# Patient Record
Sex: MALE | Race: WHITE | NOT HISPANIC OR LATINO
[De-identification: names, ages, dates, MRNs, and addresses within clinical notes are randomized per-mention and may not be internally consistent; named-entity substitution may affect disease eponyms.]

---

## 2017-09-21 ENCOUNTER — APPOINTMENT (OUTPATIENT)
Dept: FAMILY MEDICINE | Facility: CLINIC | Age: 32
End: 2017-09-21

## 2017-10-26 ENCOUNTER — APPOINTMENT (OUTPATIENT)
Dept: FAMILY MEDICINE | Facility: CLINIC | Age: 32
End: 2017-10-26
Payer: SELF-PAY

## 2017-10-26 VITALS — SYSTOLIC BLOOD PRESSURE: 157 MMHG | DIASTOLIC BLOOD PRESSURE: 100 MMHG

## 2017-10-26 PROCEDURE — 99213 OFFICE O/P EST LOW 20 MIN: CPT

## 2017-11-16 ENCOUNTER — APPOINTMENT (OUTPATIENT)
Dept: FAMILY MEDICINE | Facility: CLINIC | Age: 32
End: 2017-11-16

## 2018-01-30 ENCOUNTER — APPOINTMENT (OUTPATIENT)
Dept: FAMILY MEDICINE | Facility: CLINIC | Age: 33
End: 2018-01-30

## 2018-02-08 ENCOUNTER — APPOINTMENT (OUTPATIENT)
Dept: FAMILY MEDICINE | Facility: CLINIC | Age: 33
End: 2018-02-08
Payer: SELF-PAY

## 2018-02-08 VITALS — SYSTOLIC BLOOD PRESSURE: 161 MMHG | DIASTOLIC BLOOD PRESSURE: 114 MMHG

## 2018-02-08 PROCEDURE — 99213 OFFICE O/P EST LOW 20 MIN: CPT

## 2018-08-23 ENCOUNTER — APPOINTMENT (OUTPATIENT)
Dept: FAMILY MEDICINE | Facility: CLINIC | Age: 33
End: 2018-08-23

## 2018-11-16 ENCOUNTER — RX RENEWAL (OUTPATIENT)
Age: 33
End: 2018-11-16

## 2019-06-05 ENCOUNTER — RX RENEWAL (OUTPATIENT)
Age: 34
End: 2019-06-05

## 2019-07-01 ENCOUNTER — APPOINTMENT (OUTPATIENT)
Dept: FAMILY MEDICINE | Facility: CLINIC | Age: 34
End: 2019-07-01
Payer: SELF-PAY

## 2019-07-01 VITALS — DIASTOLIC BLOOD PRESSURE: 100 MMHG | SYSTOLIC BLOOD PRESSURE: 140 MMHG

## 2019-07-01 VITALS — TEMPERATURE: 98.3 F

## 2019-07-01 PROCEDURE — 99213 OFFICE O/P EST LOW 20 MIN: CPT

## 2019-07-01 NOTE — HISTORY OF PRESENT ILLNESS
[de-identified] : 33y/o M with PMHx of HTN (Valsartan) presents to the office for BP check and medication renewal. Pt c/o productive cough and PND for 5 days. Pt states cough and nasal discharge produces green mucous. Assoc. sx of sore throat, with raspy voice for 2 days. Additionally, pt c/o clogged ears, Pt has been using Sudafed and Advil Sinus for 2 days. Pt admits to not checking BP at home. BP in office is 140/100. Denies fatigue, chills, fever.

## 2019-07-01 NOTE — PLAN
[FreeTextEntry1] : Sinuitis \par - If no improvement in 4 days, start Amoxicillin TID \par - Mucinex D for 4 days\par - Flonase BID for 4 days\par - Hydration / Supportive care\par - Steam shower \par \par HTN\par - D/c Valsartan, due to pt not having insurance.  Bp still eleavted altho took sudafed for 2 days\par - Prescribed to start Lisinopril \par - F/u in  4 weeks.

## 2019-07-01 NOTE — ADDENDUM
[FreeTextEntry1] : I, Neris Jo, acted solely as a scribe for Dr. Briones on this date 07/01/2019.\par \par All medical record entries made by the Scribe were at my, Dr. Briones, direction and personally dictated by me on 07/01/2019. I have reviewed the chart and agree that the record accurately reflects my personal performance of the history, physical exam, assessment and plan.  I have also personally directed, reviewed and agreed with the chart.

## 2019-07-01 NOTE — REVIEW OF SYSTEMS
[Earache] : earache [Postnasal Drip] : postnasal drip [Nasal Discharge] : nasal discharge [Sore Throat] : sore throat [Cough] : cough [Negative] : Heme/Lymph [Fever] : no fever [Chills] : no chills [Fatigue] : no fatigue

## 2019-07-01 NOTE — PHYSICAL EXAM
[Normal] : no JVD, supple, thyroid normal/no nodules, no lymphadenopathy [de-identified] : mild injected with clear mucous in pharynx  [de-identified] : AA&Ox3

## 2020-07-28 ENCOUNTER — APPOINTMENT (OUTPATIENT)
Dept: FAMILY MEDICINE | Facility: CLINIC | Age: 35
End: 2020-07-28
Payer: SELF-PAY

## 2020-07-28 VITALS
WEIGHT: 236 LBS | DIASTOLIC BLOOD PRESSURE: 90 MMHG | BODY MASS INDEX: 29.34 KG/M2 | OXYGEN SATURATION: 97 % | RESPIRATION RATE: 16 BRPM | HEIGHT: 75 IN | TEMPERATURE: 98.1 F | HEART RATE: 83 BPM | SYSTOLIC BLOOD PRESSURE: 134 MMHG

## 2020-07-28 DIAGNOSIS — Z23 ENCOUNTER FOR IMMUNIZATION: ICD-10-CM

## 2020-07-28 PROCEDURE — 99214 OFFICE O/P EST MOD 30 MIN: CPT

## 2020-07-28 RX ORDER — VALSARTAN 320 MG/1
320 TABLET, COATED ORAL DAILY
Qty: 30 | Refills: 0 | Status: COMPLETED | COMMUNITY
Start: 2017-10-26 | End: 2020-07-28

## 2020-07-28 NOTE — HISTORY OF PRESENT ILLNESS
[de-identified] : 34 y/o M PMHx HTN (Lisinopril) Plan presents to office for BP check. Patient has not been here in over one year. Patient recently has been monitoring blood pressures blood pressure readings 143/103, 139/96, patient states several months ago he did have some loss of taste and smell concerned he could have had colds it. Denies any headache or shortness of breath at this time. Patient has been exercising and has been compliant with salt in his diet. Patient also requests a refill on Propecia

## 2021-06-10 ENCOUNTER — APPOINTMENT (OUTPATIENT)
Dept: FAMILY MEDICINE | Facility: CLINIC | Age: 36
End: 2021-06-10
Payer: SELF-PAY

## 2021-06-10 VITALS — DIASTOLIC BLOOD PRESSURE: 92 MMHG | SYSTOLIC BLOOD PRESSURE: 146 MMHG | TEMPERATURE: 95.7 F

## 2021-06-10 DIAGNOSIS — J20.9 ACUTE BRONCHITIS, UNSPECIFIED: ICD-10-CM

## 2021-06-10 DIAGNOSIS — J32.9 CHRONIC SINUSITIS, UNSPECIFIED: ICD-10-CM

## 2021-06-10 PROCEDURE — 99214 OFFICE O/P EST MOD 30 MIN: CPT

## 2021-06-10 RX ORDER — AMOXICILLIN 875 MG/1
875 TABLET, FILM COATED ORAL
Qty: 14 | Refills: 0 | Status: ACTIVE | COMMUNITY
Start: 2019-07-01 | End: 1900-01-01

## 2021-06-10 NOTE — REVIEW OF SYSTEMS
[Fatigue] : fatigue [Postnasal Drip] : postnasal drip [Nasal Discharge] : nasal discharge [Sore Throat] : sore throat [Cough] : cough [Swollen Glands] : swollen glands [Negative] : Gastrointestinal

## 2021-06-10 NOTE — HISTORY OF PRESENT ILLNESS
[FreeTextEntry8] : 35 y/o M PMHx HTN (Lisinopril) Plan presents to office for BP check.Pt also c/o  sinus congestion for 5 days. Denies Fever. Took DayQuil and Sudafed.Out of BP meds for 3-4 days. Thick green nasal discharge and green mucus .Mild HA. Monitoring Bg at home all good readings <130/85

## 2021-06-10 NOTE — PHYSICAL EXAM
[Normal] : affect was normal and insight and judgment were intact [de-identified] : mucus in throat [de-identified] : diffuse rhonchi

## 2022-01-15 ENCOUNTER — RX RENEWAL (OUTPATIENT)
Age: 37
End: 2022-01-15

## 2022-06-02 ENCOUNTER — APPOINTMENT (OUTPATIENT)
Dept: FAMILY MEDICINE | Facility: CLINIC | Age: 37
End: 2022-06-02
Payer: SELF-PAY

## 2022-06-02 VITALS
HEART RATE: 81 BPM | OXYGEN SATURATION: 97 % | HEIGHT: 75 IN | DIASTOLIC BLOOD PRESSURE: 92 MMHG | SYSTOLIC BLOOD PRESSURE: 124 MMHG | WEIGHT: 225 LBS | BODY MASS INDEX: 27.98 KG/M2

## 2022-06-02 DIAGNOSIS — Z72.89 OTHER PROBLEMS RELATED TO LIFESTYLE: ICD-10-CM

## 2022-06-02 PROCEDURE — 99214 OFFICE O/P EST MOD 30 MIN: CPT

## 2022-06-04 PROBLEM — Z72.89 ALCOHOL DRINKER: Status: ACTIVE | Noted: 2022-06-04

## 2022-06-04 NOTE — HISTORY OF PRESENT ILLNESS
[de-identified] : 38 y/o M PMHx HTN (Lisinopril) Plan presents to office for BP check.Pt admits to drinking 3-5 glasses of alcohol per night as he is a  and more on weekends. Eats fairly healthy but not exercising much. Offers no complaints today. Needs refill on meeds

## 2022-08-02 ENCOUNTER — APPOINTMENT (OUTPATIENT)
Dept: FAMILY MEDICINE | Facility: CLINIC | Age: 37
End: 2022-08-02

## 2022-08-02 RX ORDER — LISINOPRIL 40 MG/1
40 TABLET ORAL
Qty: 7 | Refills: 0 | Status: COMPLETED | COMMUNITY
Start: 2019-07-01 | End: 2022-08-02

## 2022-08-30 ENCOUNTER — APPOINTMENT (OUTPATIENT)
Dept: FAMILY MEDICINE | Facility: CLINIC | Age: 37
End: 2022-08-30

## 2022-09-29 ENCOUNTER — APPOINTMENT (OUTPATIENT)
Dept: FAMILY MEDICINE | Facility: CLINIC | Age: 37
End: 2022-09-29

## 2023-05-19 ENCOUNTER — APPOINTMENT (OUTPATIENT)
Dept: FAMILY MEDICINE | Facility: CLINIC | Age: 38
End: 2023-05-19
Payer: SELF-PAY

## 2023-05-19 VITALS
TEMPERATURE: 99.6 F | RESPIRATION RATE: 16 BRPM | DIASTOLIC BLOOD PRESSURE: 98 MMHG | HEART RATE: 89 BPM | OXYGEN SATURATION: 97 % | SYSTOLIC BLOOD PRESSURE: 142 MMHG

## 2023-05-19 PROCEDURE — 99213 OFFICE O/P EST LOW 20 MIN: CPT

## 2023-05-19 RX ORDER — AMLODIPINE BESYLATE 5 MG/1
5 TABLET ORAL
Qty: 60 | Refills: 5 | Status: COMPLETED | COMMUNITY
Start: 2020-07-28 | End: 2023-05-19

## 2023-05-19 NOTE — HISTORY OF PRESENT ILLNESS
[de-identified] : 39 y/o M PMH HTN (Amlodpine, Irbesartan) presents for BP f/up.Monitoring BP athome readings 140 90's 150/90's. Watches diet\par Increased Amlodipine to BID some ankle swelling

## 2023-06-08 ENCOUNTER — APPOINTMENT (OUTPATIENT)
Dept: FAMILY MEDICINE | Facility: CLINIC | Age: 38
End: 2023-06-08

## 2023-06-12 ENCOUNTER — NON-APPOINTMENT (OUTPATIENT)
Age: 38
End: 2023-06-12

## 2023-08-24 ENCOUNTER — APPOINTMENT (OUTPATIENT)
Dept: FAMILY MEDICINE | Facility: CLINIC | Age: 38
End: 2023-08-24
Payer: SELF-PAY

## 2023-08-24 VITALS
RESPIRATION RATE: 16 BRPM | DIASTOLIC BLOOD PRESSURE: 90 MMHG | OXYGEN SATURATION: 98 % | TEMPERATURE: 98.6 F | BODY MASS INDEX: 28.5 KG/M2 | HEART RATE: 108 BPM | SYSTOLIC BLOOD PRESSURE: 140 MMHG | WEIGHT: 228 LBS

## 2023-08-24 DIAGNOSIS — I10 ESSENTIAL (PRIMARY) HYPERTENSION: ICD-10-CM

## 2023-08-24 DIAGNOSIS — L65.9 NONSCARRING HAIR LOSS, UNSPECIFIED: ICD-10-CM

## 2023-08-24 PROCEDURE — 99213 OFFICE O/P EST LOW 20 MIN: CPT

## 2023-08-24 RX ORDER — IRBESARTAN 300 MG/1
300 TABLET ORAL
Qty: 90 | Refills: 3 | Status: COMPLETED | COMMUNITY
Start: 2022-08-02 | End: 2023-08-24

## 2023-08-24 RX ORDER — NIFEDIPINE 60 MG/1
60 TABLET, EXTENDED RELEASE ORAL DAILY
Qty: 30 | Refills: 5 | Status: COMPLETED | COMMUNITY
Start: 2023-05-19 | End: 2023-08-24

## 2023-08-24 NOTE — PHYSICAL EXAM
[Normal] : affect was normal and insight and judgment were intact [de-identified] : mild edema of stephany non pitting

## 2023-08-24 NOTE — HISTORY OF PRESENT ILLNESS
[de-identified] : 37 y/o M PMHx HTN (Nifedipine, Irbesartan) presents for BP check. Pt monitored BP at home. All elevated readings.  153/104, 159/112, 139/93. Also c/o swelling in legs, ankles and face. Feels bloated. Does admit to drinking alcohol but watches salt indiet

## 2023-09-14 ENCOUNTER — APPOINTMENT (OUTPATIENT)
Dept: FAMILY MEDICINE | Facility: CLINIC | Age: 38
End: 2023-09-14

## 2024-02-27 RX ORDER — OLMESARTAN MEDOXOMIL AND HYDROCHLOROTHIAZIDE 40; 25 MG/1; MG/1
40-25 TABLET ORAL
Qty: 30 | Refills: 4 | Status: ACTIVE | COMMUNITY
Start: 2023-08-24 | End: 1900-01-01

## 2024-02-27 RX ORDER — NEBIVOLOL 5 MG/1
5 TABLET ORAL
Qty: 30 | Refills: 4 | Status: ACTIVE | COMMUNITY
Start: 2023-08-24 | End: 1900-01-01

## 2024-09-10 ENCOUNTER — RX RENEWAL (OUTPATIENT)
Age: 39
End: 2024-09-10

## 2024-12-19 ENCOUNTER — APPOINTMENT (OUTPATIENT)
Dept: FAMILY MEDICINE | Facility: CLINIC | Age: 39
End: 2024-12-19
Payer: SELF-PAY

## 2024-12-19 VITALS
DIASTOLIC BLOOD PRESSURE: 100 MMHG | SYSTOLIC BLOOD PRESSURE: 140 MMHG | OXYGEN SATURATION: 97 % | WEIGHT: 230 LBS | TEMPERATURE: 97.5 F | BODY MASS INDEX: 28.6 KG/M2 | RESPIRATION RATE: 16 BRPM | HEART RATE: 77 BPM | HEIGHT: 75 IN

## 2024-12-19 DIAGNOSIS — R21 RASH AND OTHER NONSPECIFIC SKIN ERUPTION: ICD-10-CM

## 2024-12-19 DIAGNOSIS — I10 ESSENTIAL (PRIMARY) HYPERTENSION: ICD-10-CM

## 2024-12-19 DIAGNOSIS — E78.5 HYPERLIPIDEMIA, UNSPECIFIED: ICD-10-CM

## 2024-12-19 PROCEDURE — 99213 OFFICE O/P EST LOW 20 MIN: CPT

## 2024-12-20 PROBLEM — R21 SKIN RASH: Status: ACTIVE | Noted: 2024-12-20

## 2024-12-20 PROBLEM — E78.5 HYPERLIPIDEMIA: Status: ACTIVE | Noted: 2024-12-19
